# Patient Record
Sex: MALE | Race: ASIAN | NOT HISPANIC OR LATINO | Employment: FULL TIME | URBAN - METROPOLITAN AREA
[De-identification: names, ages, dates, MRNs, and addresses within clinical notes are randomized per-mention and may not be internally consistent; named-entity substitution may affect disease eponyms.]

---

## 2018-07-26 ENCOUNTER — RECORDS - HEALTHEAST (OUTPATIENT)
Dept: LAB | Facility: CLINIC | Age: 18
End: 2018-07-26

## 2018-07-26 LAB
ALBUMIN SERPL-MCNC: 4.5 G/DL (ref 3.5–5)
ALP SERPL-CCNC: 71 U/L (ref 45–120)
ALT SERPL W P-5'-P-CCNC: 50 U/L (ref 0–45)
ANION GAP SERPL CALCULATED.3IONS-SCNC: 8 MMOL/L (ref 5–18)
AST SERPL W P-5'-P-CCNC: 24 U/L (ref 0–40)
BILIRUB SERPL-MCNC: 0.8 MG/DL (ref 0–1)
BUN SERPL-MCNC: 15 MG/DL (ref 8–22)
CALCIUM SERPL-MCNC: 10.3 MG/DL (ref 8.5–10.5)
CHLORIDE BLD-SCNC: 105 MMOL/L (ref 98–107)
CHOLEST SERPL-MCNC: 194 MG/DL
CO2 SERPL-SCNC: 27 MMOL/L (ref 22–31)
CREAT SERPL-MCNC: 0.81 MG/DL (ref 0.7–1.3)
FASTING STATUS PATIENT QL REPORTED: ABNORMAL
GFR SERPL CREATININE-BSD FRML MDRD: >60 ML/MIN/1.73M2
GLUCOSE BLD-MCNC: 91 MG/DL (ref 70–125)
HDLC SERPL-MCNC: 39 MG/DL
LDLC SERPL CALC-MCNC: 115 MG/DL
POTASSIUM BLD-SCNC: 4.5 MMOL/L (ref 3.5–5)
PROT SERPL-MCNC: 7.6 G/DL (ref 6–8)
SODIUM SERPL-SCNC: 140 MMOL/L (ref 136–145)
TRIGL SERPL-MCNC: 202 MG/DL
TSH SERPL DL<=0.005 MIU/L-ACNC: 1.42 UIU/ML (ref 0.3–5)

## 2020-01-21 ENCOUNTER — OFFICE VISIT (OUTPATIENT)
Dept: FAMILY MEDICINE | Facility: CLINIC | Age: 20
End: 2020-01-21
Payer: COMMERCIAL

## 2020-01-21 ENCOUNTER — TELEPHONE (OUTPATIENT)
Dept: FAMILY MEDICINE | Facility: CLINIC | Age: 20
End: 2020-01-21

## 2020-01-21 VITALS
OXYGEN SATURATION: 93 % | RESPIRATION RATE: 16 BRPM | DIASTOLIC BLOOD PRESSURE: 70 MMHG | HEART RATE: 97 BPM | WEIGHT: 282 LBS | SYSTOLIC BLOOD PRESSURE: 144 MMHG | TEMPERATURE: 98.8 F

## 2020-01-21 DIAGNOSIS — R10.13 EPIGASTRIC PAIN: Primary | ICD-10-CM

## 2020-01-21 LAB
ALBUMIN SERPL-MCNC: 4.3 G/DL (ref 3.4–5)
ALP SERPL-CCNC: 70 U/L (ref 40–150)
ALT SERPL W P-5'-P-CCNC: 54 U/L (ref 0–70)
ANION GAP SERPL CALCULATED.3IONS-SCNC: 4 MMOL/L (ref 3–14)
AST SERPL W P-5'-P-CCNC: 20 U/L (ref 0–45)
BASOPHILS # BLD AUTO: 0 10E9/L (ref 0–0.2)
BASOPHILS NFR BLD AUTO: 0.3 %
BILIRUB SERPL-MCNC: 0.9 MG/DL (ref 0.2–1.3)
BUN SERPL-MCNC: 13 MG/DL (ref 7–30)
CALCIUM SERPL-MCNC: 9.4 MG/DL (ref 8.5–10.1)
CHLORIDE SERPL-SCNC: 107 MMOL/L (ref 94–109)
CO2 SERPL-SCNC: 27 MMOL/L (ref 20–32)
CREAT SERPL-MCNC: 0.95 MG/DL (ref 0.66–1.25)
DIFFERENTIAL METHOD BLD: ABNORMAL
EOSINOPHIL # BLD AUTO: 0.1 10E9/L (ref 0–0.7)
EOSINOPHIL NFR BLD AUTO: 2.1 %
ERYTHROCYTE [DISTWIDTH] IN BLOOD BY AUTOMATED COUNT: 13.7 % (ref 10–15)
ERYTHROCYTE [SEDIMENTATION RATE] IN BLOOD BY WESTERGREN METHOD: 4 MM/H (ref 0–15)
GFR SERPL CREATININE-BSD FRML MDRD: >90 ML/MIN/{1.73_M2}
GLUCOSE SERPL-MCNC: 89 MG/DL (ref 70–99)
HCT VFR BLD AUTO: 48.6 % (ref 40–53)
HGB BLD-MCNC: 16.8 G/DL (ref 13.3–17.7)
LIPASE SERPL-CCNC: 69 U/L (ref 73–393)
LYMPHOCYTES # BLD AUTO: 1.4 10E9/L (ref 0.8–5.3)
LYMPHOCYTES NFR BLD AUTO: 20.5 %
MCH RBC QN AUTO: 28 PG (ref 26.5–33)
MCHC RBC AUTO-ENTMCNC: 34.6 G/DL (ref 31.5–36.5)
MCV RBC AUTO: 81 FL (ref 78–100)
MONOCYTES # BLD AUTO: 0.8 10E9/L (ref 0–1.3)
MONOCYTES NFR BLD AUTO: 11.8 %
NEUTROPHILS # BLD AUTO: 4.3 10E9/L (ref 1.6–8.3)
NEUTROPHILS NFR BLD AUTO: 65.3 %
PLATELET # BLD AUTO: 254 10E9/L (ref 150–450)
POTASSIUM SERPL-SCNC: 4.1 MMOL/L (ref 3.4–5.3)
PROT SERPL-MCNC: 8 G/DL (ref 6.8–8.8)
RBC # BLD AUTO: 6.01 10E12/L (ref 4.4–5.9)
SODIUM SERPL-SCNC: 138 MMOL/L (ref 133–144)
WBC # BLD AUTO: 6.6 10E9/L (ref 4–11)

## 2020-01-21 PROCEDURE — 85025 COMPLETE CBC W/AUTO DIFF WBC: CPT | Performed by: PHYSICIAN ASSISTANT

## 2020-01-21 PROCEDURE — 99204 OFFICE O/P NEW MOD 45 MIN: CPT | Performed by: PHYSICIAN ASSISTANT

## 2020-01-21 PROCEDURE — 85652 RBC SED RATE AUTOMATED: CPT | Performed by: PHYSICIAN ASSISTANT

## 2020-01-21 PROCEDURE — 80053 COMPREHEN METABOLIC PANEL: CPT | Performed by: PHYSICIAN ASSISTANT

## 2020-01-21 PROCEDURE — 83690 ASSAY OF LIPASE: CPT | Performed by: PHYSICIAN ASSISTANT

## 2020-01-21 PROCEDURE — 36415 COLL VENOUS BLD VENIPUNCTURE: CPT | Performed by: PHYSICIAN ASSISTANT

## 2020-01-21 RX ORDER — OMEPRAZOLE 20 MG/1
20 TABLET, DELAYED RELEASE ORAL DAILY
Qty: 30 TABLET | Refills: 0 | Status: SHIPPED | OUTPATIENT
Start: 2020-01-21 | End: 2020-02-03

## 2020-01-21 NOTE — PROGRESS NOTES
S:20 yo male is here with his fiernestoe for epigastric/stomach issues for at least 1 year.  Started with epigastric pain every time he would have a shot of whiskey.  However now has been occurring more often, even with eating.  Yesterday he ate an avocado with toast and then not vomited.  The pain lasted a total of 1 hour.  Occasionally with epigastric pain it will radiate up to the middle chest.  No bilious vomiting.  No blood in his vomit.  Some history of intermittent diarrhea.  Has no current cough, sore throat or nasal congestion.  No shortness of breath.  He quit smoking cigarettes at age 17.  He does smoke E cigarettes.  He did drink 3 caffeinated sodas a day but has not for the last 3 months.  Did have acid taste in his throat yesterday when this occurred.  Also felt feverish.  Had a low-grade temp of 99 point something.  Does have some nausea and queasy feeling today.    Allergies not on file    PMHX-no history of hypertension.  Does have history of asthma  FMHX-sister possibly with gallbladder issues.  Social-e cig    No current outpatient medications on file prior to visit.  No current facility-administered medications on file prior to visit.       Social History     Tobacco Use     Smoking status: Not on file   Substance Use Topics     Alcohol use: Not on file       ROS:  CONSTITUTIONAL: Negative for fatigue or fever.  EYES: Negative for eye problems.  ENT: Negative for  ST  RESP: Negative for cough.  CV: Negative for chest pains.  GI: As above   MUSCULOSKELETAL:  Negative for significant muscle or joint pains.  NEUROLOGIC: Negative for headaches.  SKIN: Negative for rash.  PSYCH: Normal mentation for age.    OBJECTIVE:  BP (!) 144/70   Pulse 97   Temp 98.8  F (37.1  C) (Oral)   Resp 16   Wt 127.9 kg (282 lb)   SpO2 93%   GENERAL APPEARANCE: Healthy, alert and no distress.  EYES:Conjunctiva/sclera clear.  EARS: No cerumen.   Ear canals w/o erythema.  TM's intact w/o erythema.    NOSE/MOUTH: Nose  without ulcers, erythema or lesions.  SINUSES: No maxillary sinus tenderness.  THROAT: No erythema w/o tonsillar enlargement . No exudates.  NECK: Supple, nontender, no lymphadenopathy.  RESP: Lungs clear to auscultation - no rales, rhonchi or wheezes  CV: Regular rate and rhythm, normal S1 S2, no murmur noted.  NEURO: Awake, alert    SKIN: No rashes  Abdomen-mild epigastric tenderness.  Normal active bowel sounds.  No rigidity, guarding or rebound.  No hepatosplenomegaly.        ASSESSMENT:     ICD-10-CM    1. Epigastric pain R10.13 CBC with platelets differential     ESR: Erythrocyte sedimentation rate     Comprehensive metabolic panel (BMP + Alb, Alk Phos, ALT, AST, Total. Bili, TP)     Lipase     lidocaine (XYLOCAINE) 2 % 15 mL, alum & mag hydroxide-simethicone (MYLANTA ES/MAALOX  ES) 15 mL GI Cocktail     omeprazole (PRILOSEC OTC) 20 MG EC tablet         PLAN: Avoid caffeine, alcohol, greasy food, nicotine.  Screening labs done.  We will try a GI cocktail.  Start Prilosec.  Obtain primary and follow-up in 2 weeks.  Consider abdominal ultrasound.  Differential includes but is not limited to cholecystitis, pancreatitis, stomach ulcers, GERD.  Yesterday's episode may have been just viral gastroenteritis.  I have discussed clinical findings with patient.  Side effects of medications discussed.  Symptomatic care is discussed.  I have discussed the possibility of  worsening symptoms and to RTC or ER if they occur.  All questions are answered and patient is in agreement with plan.   Patient care instructions are given to at the end of visit.   Lots of rest and fluids.    Kiana Lutz PA-C

## 2020-01-21 NOTE — TELEPHONE ENCOUNTER
Patient states he forgot to get a doctors note from his appointment today. He is wondering if he could get one emailed to him at, [email or web address]. Please advise

## 2020-01-24 NOTE — TELEPHONE ENCOUNTER
Left message on pt vm asking if he just needs a note saying he was seen in clinic or does it need different information on it?  Advised we are unable to send to his email.  We can send it through [employer] or fax or send through mail, which would he like.  Sofia HYMANN, RN

## 2020-02-03 ENCOUNTER — OFFICE VISIT (OUTPATIENT)
Dept: FAMILY MEDICINE | Facility: CLINIC | Age: 20
End: 2020-02-03
Payer: COMMERCIAL

## 2020-02-03 VITALS
BODY MASS INDEX: 40.8 KG/M2 | HEIGHT: 70 IN | HEART RATE: 73 BPM | TEMPERATURE: 98.2 F | SYSTOLIC BLOOD PRESSURE: 124 MMHG | WEIGHT: 285 LBS | DIASTOLIC BLOOD PRESSURE: 84 MMHG | OXYGEN SATURATION: 96 %

## 2020-02-03 DIAGNOSIS — K21.9 GASTROESOPHAGEAL REFLUX DISEASE, ESOPHAGITIS PRESENCE NOT SPECIFIED: Primary | ICD-10-CM

## 2020-02-03 DIAGNOSIS — E66.01 MORBID OBESITY (H): ICD-10-CM

## 2020-02-03 DIAGNOSIS — M94.0 COSTOCHONDRITIS: ICD-10-CM

## 2020-02-03 PROCEDURE — 99213 OFFICE O/P EST LOW 20 MIN: CPT | Performed by: PHYSICIAN ASSISTANT

## 2020-02-03 ASSESSMENT — PAIN SCALES - GENERAL: PAINLEVEL: NO PAIN (0)

## 2020-02-03 ASSESSMENT — MIFFLIN-ST. JEOR: SCORE: 2291.06

## 2020-02-03 NOTE — PATIENT INSTRUCTIONS
Patient Education     Tips to Control Acid Reflux    To control acid reflux, you ll need to make some basic diet and lifestyle changes. The simple steps outlined below may be all you ll need to ease discomfort.  Watch what you eat    Avoid fatty foods and spicy foods.    Eat fewer acidic foods, such as citrus and tomato-based foods. These can increase symptoms.    Limit drinking alcohol, caffeine, and fizzy beverages. All increase acid reflux.    Try limiting chocolate, peppermint, and spearmint. These can worsen acid reflux in some people.  Watch when you eat    Avoid lying down for 3 hours after eating.    Do not snack before going to bed.  Raise your head  Raising your head and upper body by 4 to 6 inches helps limit reflux when you re lying down. Put blocks under the head of your bed frame to raise it.  Other changes    Lose weight, if you need to    Don t exercise near bedtime    Avoid tight-fitting clothes    Limit aspirin and ibuprofen    Stop smoking   Date Last Reviewed: 7/1/2016 2000-2019 [employer] [address] [place] [address], [place] [address]. All rights reserved. This information is not intended as a substitute for professional medical care. Always follow your healthcare professional's instructions.        YOU CAN CONTINUE TO USE THE OMEPRAZOLE 20 MG DAILY

## 2020-02-03 NOTE — NURSING NOTE
"Chief Complaint   Patient presents with     RECHECK     abdominal       Initial BP (!) 141/80   Pulse 73   Temp 98.2  F (36.8  C) (Oral)   Ht 1.765 m (5' 9.5\")   Wt 129.3 kg (285 lb)   SpO2 96%   BMI 41.48 kg/m   Estimated body mass index is 41.48 kg/m  as calculated from the following:    Height as of this encounter: 1.765 m (5' 9.5\").    Weight as of this encounter: 129.3 kg (285 lb).  Medication Reconciliation: complete    ELEANOR Nunez MA    "

## 2020-02-03 NOTE — PROGRESS NOTES
"Subjective     Natalio Hall is a 20 year old male who presents to clinic today for the following health issues:    HPI     Recheck upper abdominal issues, feeling better.  He is new to seeing me today. He was seen in Urgent Care and treated for epigastric pain. He took the omeprazole and the pain is no longer an issue for him. He is not needing to take the omeprazole daily.     He also has pain that he has developed in the left side over the ribs. It is sore to push on that area. He does not recall an injury. It does not hurt to take a deep breath. It has not stopped his regular activities. No shortness of breath. No radiation of the pain.       There is no problem list on file for this patient.    History reviewed. No pertinent surgical history.    Social History     Tobacco Use     Smoking status: Current Every Day Smoker     Types: Other     Smokeless tobacco: Never Used   Substance Use Topics     Alcohol use: Yes     Comment: social     Family History   Problem Relation Age of Onset     Diabetes Mother          No current outpatient medications on file.     Allergies   Allergen Reactions     Risperidone      BP Readings from Last 3 Encounters:   02/03/20 124/84   01/21/20 (!) 144/70    Wt Readings from Last 3 Encounters:   02/03/20 129.3 kg (285 lb)   01/21/20 127.9 kg (282 lb)                    Reviewed and updated as needed this visit by Provider  Tobacco  Allergies  Meds  Problems  Med Hx  Surg Hx  Fam Hx         Review of Systems   ROS COMP: Constitutional, HEENT, cardiovascular, pulmonary, GI, , musculoskeletal, neuro, skin, endocrine and psych systems are negative, except as otherwise noted.      Objective    /84   Pulse 73   Temp 98.2  F (36.8  C) (Oral)   Ht 1.765 m (5' 9.5\")   Wt 129.3 kg (285 lb)   SpO2 96%   BMI 41.48 kg/m    Body mass index is 41.48 kg/m .  Physical Exam   GENERAL: healthy, alert and no distress  RESP: lungs clear to auscultation - no rales, rhonchi or " "wheezes  CV: regular rate and rhythm, normal S1 S2, no S3 or S4, no murmur, click or rub, no peripheral edema and peripheral pulses strong  CHEST WALL: he has mild tenderness over the left lower ribs. No bruising. No change in his breathing or rib abnormality palpable.   ABDOMEN: soft, nontender, no hepatosplenomegaly, no masses and bowel sounds normal  MS: no gross musculoskeletal defects noted, no edema  SKIN: no suspicious lesions or rashes  PSYCH: mentation appears normal, affect normal/bright    Diagnostic Test Results:  Labs reviewed in Epic        Assessment & Plan     1. Gastroesophageal reflux disease, esophagitis presence not specified  He can continue to use the omeprazole 20 mg over the counter.   Discussed importance of lifestyle changes to avoid GERD symptoms. He was given a handout reviewing dietary changes, weight loss. k    2. Costochondritis  Heat / rest.   He can use tylenol, avoid excessive use of NSAIDS due to the GERD symptoms.     3. Morbid obesity (H)  Weight loss will help with the GERD symptoms. Dietary information given.        Tobacco Cessation:   reports that he has been smoking other. He has never used smokeless tobacco.  Tobacco Cessation Action Plan: Information offered: Patient not interested at this time      BMI:   Estimated body mass index is 41.48 kg/m  as calculated from the following:    Height as of this encounter: 1.765 m (5' 9.5\").    Weight as of this encounter: 129.3 kg (285 lb).   Weight management plan: Discussed healthy diet and exercise guidelines          Return in about 1 month (around 3/3/2020) for with primary provider.    Kristen M. Kehr, PA-C  [facility]    "

## 2020-02-24 ENCOUNTER — HEALTH MAINTENANCE LETTER (OUTPATIENT)
Age: 20
End: 2020-02-24

## 2020-12-10 ENCOUNTER — RECORDS - HEALTHEAST (OUTPATIENT)
Dept: LAB | Facility: CLINIC | Age: 20
End: 2020-12-10

## 2020-12-13 ENCOUNTER — HEALTH MAINTENANCE LETTER (OUTPATIENT)
Age: 20
End: 2020-12-13

## 2020-12-15 LAB
C TRACH DNA SPEC QL PROBE+SIG AMP: NEGATIVE
N GONORRHOEA DNA SPEC QL NAA+PROBE: NEGATIVE

## 2021-04-17 ENCOUNTER — HEALTH MAINTENANCE LETTER (OUTPATIENT)
Age: 21
End: 2021-04-17

## 2021-05-31 ENCOUNTER — RECORDS - HEALTHEAST (OUTPATIENT)
Dept: ADMINISTRATIVE | Facility: CLINIC | Age: 21
End: 2021-05-31

## 2021-06-01 ENCOUNTER — RECORDS - HEALTHEAST (OUTPATIENT)
Dept: ADMINISTRATIVE | Facility: CLINIC | Age: 21
End: 2021-06-01

## 2021-06-03 ENCOUNTER — RECORDS - HEALTHEAST (OUTPATIENT)
Dept: ADMINISTRATIVE | Facility: CLINIC | Age: 21
End: 2021-06-03

## 2021-09-26 ENCOUNTER — HEALTH MAINTENANCE LETTER (OUTPATIENT)
Age: 21
End: 2021-09-26

## 2022-04-08 ENCOUNTER — LAB REQUISITION (OUTPATIENT)
Dept: LAB | Facility: CLINIC | Age: 22
End: 2022-04-08

## 2022-04-08 DIAGNOSIS — N50.811 RIGHT TESTICULAR PAIN: ICD-10-CM

## 2022-04-08 PROCEDURE — 87591 N.GONORRHOEAE DNA AMP PROB: CPT | Performed by: PHYSICIAN ASSISTANT

## 2022-04-08 PROCEDURE — 87491 CHLMYD TRACH DNA AMP PROBE: CPT | Performed by: PHYSICIAN ASSISTANT

## 2022-04-11 LAB
C TRACH DNA SPEC QL PROBE+SIG AMP: NEGATIVE
N GONORRHOEA DNA SPEC QL NAA+PROBE: NEGATIVE

## 2022-05-08 ENCOUNTER — HEALTH MAINTENANCE LETTER (OUTPATIENT)
Age: 22
End: 2022-05-08

## 2023-04-23 ENCOUNTER — HEALTH MAINTENANCE LETTER (OUTPATIENT)
Age: 23
End: 2023-04-23

## 2023-06-02 ENCOUNTER — HEALTH MAINTENANCE LETTER (OUTPATIENT)
Age: 23
End: 2023-06-02

## 2024-03-29 ENCOUNTER — OFFICE VISIT (OUTPATIENT)
Dept: PHARMACY | Facility: PHYSICIAN GROUP | Age: 24
End: 2024-03-29
Payer: COMMERCIAL

## 2024-03-29 DIAGNOSIS — Z13.79 ENCOUNTER FOR PHARMACOGENETIC TESTING: Primary | ICD-10-CM

## 2024-03-29 PROCEDURE — 99207 PR NO CHARGE LOS: CPT | Performed by: PHARMACIST

## 2024-03-29 NOTE — PROGRESS NOTES
Clinical Pharmacy Consult:                                                    Natalio Hall is a 24 year old male coming in for a clinical pharmacist consult.  He was referred to me from Elisa Smith PA-C.     Reason for Consult: Pharmacogenetic testing    Discussion:   Indication for testing: Depression and Anxiety  Current medication(s): none    Therapies Tried/Response: bupropion, clonazepam, adderall     Patient's reasons for doing pharmacogenetic testing: provider wants to start medication, he has history of bad med experiences and prefers to have some guidance to help with selection    Insurance information: Patient is the primary account chou.     Education Provided:  - Potential impact of pharmacokinetic and pharmacodynamic genetic variation on medication metabolism and action  - Reviewed genes tested  - Reviewed what report will look like  - How information may be helpful in guiding treatment  - How results may be useful when reviewing safety of other medications  - Limitations of test results on individual medication experience and other factors that impact medication selection  Patient Consent Form reviewed with patient, patient provided opportunity to ask questions, and confirmed understanding.      Plan:  1. Cheek swab collected and sample sent to [facility] for processing.(#[id number])  2. Continue to take all of your current medications until we have your results and come up with a plan with your doctor.     3. Once your results are back I will give a summary of the results to your provider. Recommend scheduling follow-up with Elisa Smith PA-C in 3-4 weeks.    Delia Hilliard, PharmD, BCACP  Medication Therapy Management Pharmacist  [facility]  [phone]

## 2024-07-12 ENCOUNTER — LAB REQUISITION (OUTPATIENT)
Dept: LAB | Facility: CLINIC | Age: 24
End: 2024-07-12

## 2024-07-12 DIAGNOSIS — G44.52 NEW DAILY PERSISTENT HEADACHE (NDPH): ICD-10-CM

## 2024-07-12 PROCEDURE — 82040 ASSAY OF SERUM ALBUMIN: CPT | Performed by: NURSE PRACTITIONER

## 2024-07-13 LAB
ALBUMIN SERPL BCG-MCNC: 4.9 G/DL (ref 3.5–5.2)
ALP SERPL-CCNC: 53 U/L (ref 40–150)
ALT SERPL W P-5'-P-CCNC: 29 U/L (ref 0–70)
ANION GAP SERPL CALCULATED.3IONS-SCNC: 13 MMOL/L (ref 7–15)
AST SERPL W P-5'-P-CCNC: 21 U/L (ref 0–45)
BILIRUB SERPL-MCNC: 0.6 MG/DL
BUN SERPL-MCNC: 17.9 MG/DL (ref 6–20)
CALCIUM SERPL-MCNC: 9.8 MG/DL (ref 8.6–10)
CHLORIDE SERPL-SCNC: 106 MMOL/L (ref 98–107)
CREAT SERPL-MCNC: 0.92 MG/DL (ref 0.67–1.17)
DEPRECATED HCO3 PLAS-SCNC: 26 MMOL/L (ref 22–29)
EGFRCR SERPLBLD CKD-EPI 2021: >90 ML/MIN/1.73M2
GLUCOSE SERPL-MCNC: 87 MG/DL (ref 70–99)
POTASSIUM SERPL-SCNC: 4.1 MMOL/L (ref 3.4–5.3)
PROT SERPL-MCNC: 7.8 G/DL (ref 6.4–8.3)
SODIUM SERPL-SCNC: 145 MMOL/L (ref 135–145)

## 2024-10-08 ENCOUNTER — OFFICE VISIT (OUTPATIENT)
Dept: PHARMACY | Facility: PHYSICIAN GROUP | Age: 24
End: 2024-10-08
Payer: COMMERCIAL

## 2024-10-08 DIAGNOSIS — Z13.79 ENCOUNTER FOR PHARMACOGENETIC TESTING: Primary | ICD-10-CM

## 2024-10-08 DIAGNOSIS — E88.89 CYP2C19 RAPID METABOLIZER (H): ICD-10-CM

## 2024-10-08 PROCEDURE — 99207 PR NO CHARGE LOS: CPT | Performed by: PHARMACIST

## 2024-10-08 NOTE — PROGRESS NOTES
"Clinical Pharmacy Chart Review:                                                    This patient recently had pharmacogenetics testing completed through [facility]. Unfortunately, this patient's insurance does not cover an MTM visit to discuss the results, therefore a chart review has been performed to assess the patient's medications for safety and efficacy. This chart review is being routed to the patient's primary care provider in order for the PCP to meet with the patient to discuss the results and make any necessary medication adjustments.       Pharmacogenetic (PGx) Results:   Pharmacogenetic testing was ordered for this patient to assist in the treatment of Depression and Anxiety. See \"[facility] Comprehensive Results\" in Chart documents, or in Attachments at bottom of Progress Notes for complete list of pharmacogenetic results.     Results relevant for PGx consult reason:    - [id number]  rapid metabolizer: increased FSI8S74 function  Expected to have decreased levels of medications metabolized by WFF4I29 (e.g., tertiary TCAs, sertraline, escitalopram, citalopram, PPIs, voriconazole ) and may be at increased risk for lack of effiacy. Expected to have somewhat increased levels of medications activated by XGD2H94 and may be at increased risk for side effects.     Other notable PGx results:    - HLA-B*57:01 positive- Positive for presence of the HLA-B*57:01 allele. Increased risk of hypersensitivity and severe hepatotoxicity induced by certain medications (abacavir, pazopanib)    Genes with lower quality of evidence:  * Not as many large powered studies are available for the below genes.  There are not currently treatment recommendations or guidelines regarding these genes.  - SLC6A4 S/S genotype: Clinical recommendations are not provided for serotonin reuptake inhibitor antidepressants based on SLC6A4 genotypes, because the evidence supporting an association is mixed and/or insufficient to support clinical validity and " utility at this time (CPIC level C: no recommendation).  This genotype was shown to exhibit different phenotypes in East  populations, as opposite outcomes were observed for this genotype in East  populations when compared to  populations.    - OPRM1 Variant present: The GG genotype (or Asp/Asp isoform) may be associated with altered mu-1 opioid receptor expression or function. The G allele has been associated with opioid and alcohol addiction as well as pain sensitivity, however, studies are conflicting.    Drug interactions (as of 10/8/2024): None    Pharmacogenetic Assessment and Recommendations:   Variability in CYP2D6, GIE1A79, CYP2C9 and other genes can impact the effectiveness and risk toxicity of certain medications used. Based on this patient's pharmacogenetic test results and clinical assessment, consider the following:   - Consider trial of medication not metabolized by NWU1O20, like fluoxetine or venlafaxine.     Delia Hilliard, PharmD, [facility] Center[facility]  Medication Therapy Management Pharmacist  [facility]  [phone]

## 2024-11-17 ENCOUNTER — HEALTH MAINTENANCE LETTER (OUTPATIENT)
Age: 24
End: 2024-11-17

## 2025-08-05 ENCOUNTER — LAB REQUISITION (OUTPATIENT)
Dept: LAB | Facility: CLINIC | Age: 25
End: 2025-08-05

## 2025-08-05 DIAGNOSIS — R42 DIZZINESS AND GIDDINESS: ICD-10-CM

## 2025-08-05 PROCEDURE — 84443 ASSAY THYROID STIM HORMONE: CPT | Performed by: PHYSICIAN ASSISTANT

## 2025-08-05 PROCEDURE — 80053 COMPREHEN METABOLIC PANEL: CPT | Performed by: PHYSICIAN ASSISTANT

## 2025-08-06 LAB
ALBUMIN SERPL BCG-MCNC: 4.7 G/DL (ref 3.5–5.2)
ALP SERPL-CCNC: 61 U/L (ref 40–150)
ALT SERPL W P-5'-P-CCNC: 24 U/L (ref 0–70)
ANION GAP SERPL CALCULATED.3IONS-SCNC: 11 MMOL/L (ref 7–15)
AST SERPL W P-5'-P-CCNC: 25 U/L (ref 0–45)
BILIRUB SERPL-MCNC: 0.5 MG/DL
BUN SERPL-MCNC: 22.6 MG/DL (ref 6–20)
CALCIUM SERPL-MCNC: 9.8 MG/DL (ref 8.8–10.4)
CHLORIDE SERPL-SCNC: 102 MMOL/L (ref 98–107)
CREAT SERPL-MCNC: 0.95 MG/DL (ref 0.67–1.17)
EGFRCR SERPLBLD CKD-EPI 2021: >90 ML/MIN/1.73M2
GLUCOSE SERPL-MCNC: 88 MG/DL (ref 70–99)
HCO3 SERPL-SCNC: 22 MMOL/L (ref 22–29)
POTASSIUM SERPL-SCNC: 3.9 MMOL/L (ref 3.4–5.3)
PROT SERPL-MCNC: 7.4 G/DL (ref 6.4–8.3)
SODIUM SERPL-SCNC: 135 MMOL/L (ref 135–145)
TSH SERPL DL<=0.005 MIU/L-ACNC: 1.73 UIU/ML (ref 0.3–4.2)